# Patient Record
Sex: MALE | Race: WHITE | NOT HISPANIC OR LATINO | Employment: FULL TIME | URBAN - METROPOLITAN AREA
[De-identification: names, ages, dates, MRNs, and addresses within clinical notes are randomized per-mention and may not be internally consistent; named-entity substitution may affect disease eponyms.]

---
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
## 2022-03-31 ENCOUNTER — OFFICE VISIT (OUTPATIENT)
Dept: URGENT CARE | Facility: CLINIC | Age: 22
End: 2022-03-31
Payer: OTHER GOVERNMENT

## 2022-03-31 VITALS
BODY MASS INDEX: 24.44 KG/M2 | SYSTOLIC BLOOD PRESSURE: 114 MMHG | WEIGHT: 165 LBS | DIASTOLIC BLOOD PRESSURE: 60 MMHG | OXYGEN SATURATION: 96 % | HEART RATE: 92 BPM | RESPIRATION RATE: 18 BRPM | TEMPERATURE: 98.1 F | HEIGHT: 69 IN

## 2022-03-31 DIAGNOSIS — J02.9 SORE THROAT: ICD-10-CM

## 2022-03-31 LAB
INT CON NEG: NORMAL
INT CON POS: NORMAL
S PYO AG THROAT QL: NEGATIVE

## 2022-03-31 PROCEDURE — 87880 STREP A ASSAY W/OPTIC: CPT | Performed by: FAMILY MEDICINE

## 2022-03-31 PROCEDURE — 99213 OFFICE O/P EST LOW 20 MIN: CPT | Performed by: FAMILY MEDICINE

## 2022-03-31 ASSESSMENT — ENCOUNTER SYMPTOMS: SORE THROAT: 1

## 2022-03-31 ASSESSMENT — FIBROSIS 4 INDEX: FIB4 SCORE: 0.45

## 2022-03-31 NOTE — PROGRESS NOTES
"Subjective     Kai Rubi is a 21 y.o. male who presents with Sore Throat (X 2 days, sore throat. Nasal congestion.)      - This is a pleasant and nontoxic appearing 21 y.o. male who has come to the walk-in clinic today for:    #1) sore throat x 2 days, a little stuffy nose. No NVFC, no cough. Feels well otherwise.       ALLERGIES:  Peanut oil and Amoxicillin     PMH:  Past Medical History:   Diagnosis Date   • Foot fracture         PSH:  History reviewed. No pertinent surgical history.    MEDS:  No current outpatient medications on file.    ** I have documented what I find to be significant in regards to past medical, social, family and surgical history  in my HPI or under PMH/PSH/FH review section, otherwise it is noncontributory **           HPI    Review of Systems   HENT: Positive for congestion and sore throat.    All other systems reviewed and are negative.             Objective     /60   Pulse 92   Temp 36.7 °C (98.1 °F) (Temporal)   Resp 18   Ht 1.753 m (5' 9\")   Wt 74.8 kg (165 lb)   SpO2 96%   BMI 24.37 kg/m²      Physical Exam  Vitals and nursing note reviewed.   Constitutional:       General: He is not in acute distress.     Appearance: Normal appearance. He is well-developed.   HENT:      Head: Normocephalic.      Mouth/Throat:      Mouth: Mucous membranes are moist.      Pharynx: Oropharynx is clear. Posterior oropharyngeal erythema present. No oropharyngeal exudate.   Cardiovascular:      Heart sounds: Normal heart sounds. No murmur heard.  Pulmonary:      Effort: Pulmonary effort is normal. No respiratory distress.      Breath sounds: Normal breath sounds.   Musculoskeletal:      Cervical back: No tenderness.   Lymphadenopathy:      Cervical: No cervical adenopathy.   Neurological:      Mental Status: He is alert.      Motor: No abnormal muscle tone.   Psychiatric:         Mood and Affect: Mood normal.         Behavior: Behavior normal.           Assessment & Plan       1. Sore throat "  POCT Rapid Strep A     * viral uri     - Dx, plan & d/c instructions discussed   - Rest, stay hydrated, OTC Motrin and/or Tylenol as needed  - E.R. precautions discussed     Asked to kindly follow up with their PCP's office for a recheck, ER if not improving or feeling/getting worse. If you do not have a primary care doctor and need to schedule one you may call [facility] at [phone] to do this.    Any realistic side effects of medications that may have been given today reviewed.     Patient left in stable condition     POCT results reviewed/discussed